# Patient Record
Sex: FEMALE | Race: WHITE
[De-identification: names, ages, dates, MRNs, and addresses within clinical notes are randomized per-mention and may not be internally consistent; named-entity substitution may affect disease eponyms.]

---

## 2019-04-03 ENCOUNTER — HOSPITAL ENCOUNTER (EMERGENCY)
Dept: HOSPITAL 7 - FB.ED | Age: 24
Discharge: HOME | End: 2019-04-03
Payer: MEDICAID

## 2019-04-03 DIAGNOSIS — B34.9: Primary | ICD-10-CM

## 2019-04-03 NOTE — EDM.PDOC
ED HPI GENERAL MEDICAL PROBLEM





- General


Stated Complaint: HEADACHE JAW PAIN FEVER


Time Seen by Provider: 04/03/19 19:23


Source of Information: Reports: Patient, Family


History Limitations: Reports: No Limitations





- History of Present Illness


INITIAL COMMENTS - FREE TEXT/NARRATIVE: 





23 y.o.w.quinten came with her family to the ed due to a running nose and tooth pain 

for a few days. Pt thinks hwe r wisdom teeth may be the reason why she has 

dental pain. The main reason why she came to the ed was her running nose. No N/V

/D no dizziness or any other acute medical issues.  /74 RR 17 Pulse ox 97

% on RA Temp 37.1 Pulse 105


Onset Date: 04/03/19


Onset Time: 07:00


Duration: Hour(s):, Intermittent


Location: Reports: Face


Quality: Reports: Ache, Burning, Dull


Severity: Moderate


Improves with: Reports: Medication


Worsens with: Reports: None


Context: Reports: Sick Contact


Associated Symptoms: Reports: No Other Symptoms


Treatments PTA: Reports: Acetaminophen


  ** both jaw pain


Pain Score (Numeric/FACES): 5





- Related Data


 Allergies











Allergy/AdvReac Type Severity Reaction Status Date / Time


 


No Known Allergies Allergy   Verified 04/03/19 19:57











Home Meds: 


 Home Meds





NK [No Known Home Meds]  04/03/19 [History]











ED ROS ENT





- Review of Systems


Review Of Systems: See Below


Constitutional: Reports: No Symptoms


HEENT: Reports: Dental Pain, Rhinitis


Respiratory: Reports: No Symptoms


Cardiovascular: Reports: No Symptoms


Endocrine: Reports: No Symptoms


GI/Abdominal: Reports: No Symptoms


: Reports: No Symptoms


Musculoskeletal: Reports: No Symptoms


Skin: Reports: No Symptoms


Neurological: Reports: No Symptoms


Psychiatric: Reports: No Symptoms


Hematologic/Lymphatic: Reports: No Symptoms


Immunologic: Reports: No Symptoms





ED EXAM, ENT





- Physical Exam


Exam: See Below


Exam Limited By: No Limitations


General Appearance: Alert, WD/WN, Mild Distress


Eye Exam: Bilateral Eye: Normal Inspection


Ears: Normal External Exam, Normal Canal, Hearing Grossly Normal


Nose: Normal Mucousa, No Blood, Clear Rhinorrhea


Mouth/Throat: Normal Inspection, Normal Gums, Normal Lips, Normal Oropharynx


Head: Atraumatic, Normocephalic


Neck: Normal Inspection, Supple, Non-Tender, Full Range of Motion


Respiratory/Chest: No Respiratory Distress, Lungs Clear, Normal Breath Sounds, 

No Accessory Muscle Use, Chest Non-Tender


Cardiovascular: Normal Peripheral Pulses, Regular Rate, Rhythm, No Edema, No 

Gallop, No JVD, No Murmur, No Rub


GI/Abdominal: Normal Bowel Sounds, Soft, Non-Tender, No Organomegaly, No 

Distention, No Abnormal Bruit, No Mass, Pelvis Stable


 (Female) Exam: Deferred


Rectal (Female) Exam: Deferred


Back: Normal Inspection


Extremities: Normal Inspection


Neurological: Alert, Oriented, CN II-XII Intact, Normal Cognition, Normal Gait, 

Normal Reflexes, No Motor/Sensory Deficits


Psychiatric: Normal Affect, Normal Mood


Skin: Warm, Dry, Intact, Normal Color, No Rash


Lymphatic: No Adenopathy





Course





- Vital Signs


Text/Narrative:: 


23 y.o.w.f came with her family to the ed due to a running nose and tooth pain 

for a few days. Pt thinks hwe r wisdom teeth may be the reason why she has 

dental pain. The main reason why she came to the ed was her running nose. No N/V

/D no dizziness or any other acute medical issues.  /74 RR 17 Pulse ox 97

% on RA Temp 37.1 Pulse 105


PE: WNWD W F with a running nose


Labs: Refused tests


Impression: Viral syndrome DDX: Influenza, RASV etc


Tx: None


Reexam: Pt was doing fine in te ED


Plan: D/C with instructions


Last Recorded V/S: 


 Last Vital Signs











Temp  37.4 C   04/03/19 19:23


 


Pulse  105 H  04/03/19 19:23


 


Resp  17   04/03/19 19:23


 


BP  101/74   04/03/19 19:23


 


Pulse Ox  96   04/03/19 19:23














Departure





- Departure


Time of Disposition: 20:01


Disposition: Home, Self-Care 01


Condition: Good


Clinical Impression: 


 Viral syndrome








- Discharge Information


Instructions:  Viral Illness, Adult


Referrals: 


Harjeet Walsh PA [Primary Care Provider] - 


Forms:  ED Department Discharge


Additional Instructions: 


Please take tylenol/motrin for pain and temp. please increase water intake, 

please f/u, come back if your symptoms get worse acutely